# Patient Record
Sex: MALE | Race: WHITE | NOT HISPANIC OR LATINO | Employment: OTHER | URBAN - METROPOLITAN AREA
[De-identification: names, ages, dates, MRNs, and addresses within clinical notes are randomized per-mention and may not be internally consistent; named-entity substitution may affect disease eponyms.]

---

## 2018-05-23 ENCOUNTER — HOSPITAL ENCOUNTER (EMERGENCY)
Facility: HOSPITAL | Age: 63
Discharge: HOME | End: 2018-05-23
Attending: EMERGENCY MEDICINE | Admitting: EMERGENCY MEDICINE

## 2018-05-23 VITALS
RESPIRATION RATE: 18 BRPM | HEART RATE: 92 BPM | WEIGHT: 170 LBS | SYSTOLIC BLOOD PRESSURE: 179 MMHG | TEMPERATURE: 99.9 F | HEIGHT: 67 IN | DIASTOLIC BLOOD PRESSURE: 106 MMHG | OXYGEN SATURATION: 99 % | BODY MASS INDEX: 26.68 KG/M2

## 2018-05-23 DIAGNOSIS — K14.8 TONGUE LESION: Primary | ICD-10-CM

## 2018-05-23 LAB
ALBUMIN SERPL-MCNC: 4.5 G/DL (ref 3.5–5.2)
ALBUMIN/GLOB SERPL: 1.5 G/DL
ALP SERPL-CCNC: 85 U/L (ref 39–117)
ALT SERPL W P-5'-P-CCNC: 88 U/L (ref 1–41)
ANION GAP SERPL CALCULATED.3IONS-SCNC: 15.2 MMOL/L
AST SERPL-CCNC: 83 U/L (ref 1–40)
BASOPHILS # BLD AUTO: 0.09 10*3/MM3 (ref 0–0.2)
BASOPHILS NFR BLD AUTO: 0.9 % (ref 0–1.5)
BILIRUB SERPL-MCNC: 2 MG/DL (ref 0.1–1.2)
BUN BLD-MCNC: 12 MG/DL (ref 8–23)
BUN/CREAT SERPL: 10.3 (ref 7–25)
CALCIUM SPEC-SCNC: 9.9 MG/DL (ref 8.6–10.5)
CHLORIDE SERPL-SCNC: 93 MMOL/L (ref 98–107)
CO2 SERPL-SCNC: 24.8 MMOL/L (ref 22–29)
CREAT BLD-MCNC: 1.16 MG/DL (ref 0.76–1.27)
DEPRECATED RDW RBC AUTO: 48.4 FL (ref 37–54)
EOSINOPHIL # BLD AUTO: 0.06 10*3/MM3 (ref 0–0.7)
EOSINOPHIL NFR BLD AUTO: 0.6 % (ref 0.3–6.2)
ERYTHROCYTE [DISTWIDTH] IN BLOOD BY AUTOMATED COUNT: 13.9 % (ref 11.5–14.5)
GFR SERPL CREATININE-BSD FRML MDRD: 64 ML/MIN/1.73
GLOBULIN UR ELPH-MCNC: 3 GM/DL
GLUCOSE BLD-MCNC: 105 MG/DL (ref 65–99)
HCT VFR BLD AUTO: 39.7 % (ref 40.4–52.2)
HGB BLD-MCNC: 13.4 G/DL (ref 13.7–17.6)
IMM GRANULOCYTES # BLD: 0.03 10*3/MM3 (ref 0–0.03)
IMM GRANULOCYTES NFR BLD: 0.3 % (ref 0–0.5)
LYMPHOCYTES # BLD AUTO: 1.35 10*3/MM3 (ref 0.9–4.8)
LYMPHOCYTES NFR BLD AUTO: 13 % (ref 19.6–45.3)
MCH RBC QN AUTO: 32.6 PG (ref 27–32.7)
MCHC RBC AUTO-ENTMCNC: 33.8 G/DL (ref 32.6–36.4)
MCV RBC AUTO: 96.6 FL (ref 79.8–96.2)
MONOCYTES # BLD AUTO: 1.61 10*3/MM3 (ref 0.2–1.2)
MONOCYTES NFR BLD AUTO: 15.5 % (ref 5–12)
NEUTROPHILS # BLD AUTO: 7.29 10*3/MM3 (ref 1.9–8.1)
NEUTROPHILS NFR BLD AUTO: 70 % (ref 42.7–76)
PLATELET # BLD AUTO: 161 10*3/MM3 (ref 140–500)
PMV BLD AUTO: 12.7 FL (ref 6–12)
POTASSIUM BLD-SCNC: 4.3 MMOL/L (ref 3.5–5.2)
PROT SERPL-MCNC: 7.5 G/DL (ref 6–8.5)
RBC # BLD AUTO: 4.11 10*6/MM3 (ref 4.6–6)
SODIUM BLD-SCNC: 133 MMOL/L (ref 136–145)
WBC NRBC COR # BLD: 10.4 10*3/MM3 (ref 4.5–10.7)

## 2018-05-23 PROCEDURE — 99283 EMERGENCY DEPT VISIT LOW MDM: CPT

## 2018-05-23 PROCEDURE — 85025 COMPLETE CBC W/AUTO DIFF WBC: CPT | Performed by: NURSE PRACTITIONER

## 2018-05-23 PROCEDURE — 80053 COMPREHEN METABOLIC PANEL: CPT | Performed by: NURSE PRACTITIONER

## 2018-05-23 RX ORDER — METOPROLOL TARTRATE 50 MG/1
50 TABLET, FILM COATED ORAL 2 TIMES DAILY
COMMUNITY

## 2018-05-23 RX ORDER — FENTANYL 25 UG/H
1 PATCH TRANSDERMAL
COMMUNITY

## 2018-05-23 RX ORDER — OXYCODONE HYDROCHLORIDE 15 MG/1
15 TABLET ORAL EVERY 6 HOURS PRN
COMMUNITY

## 2018-05-23 RX ORDER — ALLOPURINOL 100 MG/1
100 TABLET ORAL DAILY
COMMUNITY

## 2018-05-23 RX ADMIN — LIDOCAINE HYDROCHLORIDE 5 ML: 20 SOLUTION ORAL; TOPICAL at 14:27

## 2018-05-23 NOTE — ED PROVIDER NOTES
Pt is a 62 y.o. male who presents to the ED complaining of tongue pain and swelling that started after waking up yesterday morning. Pt denies cough, SOA, CP, throat pain. Pt reports a hx of gout.      On exam,  Constitutional: A&Ox3, NAD  HENT: There is bilateral desquamation with mild swelling and bruising to middle of tongue, the is no buccal lesion, no pharyngeal lesions, no difficulty tolerating secretions    Plan: Consult with oral surgery    D/W Dr Castle who will see patient in the office tomorrow and biopsy if necessary.      MD ATTESTATION NOTE    The TRAVIS and I have discussed this patient's history, physical exam, and treatment plan.  I have reviewed the documentation and personally had a face to face interaction with the patient. I affirm the documentation and agree with the treatment and plan.  The attached note describes my personal findings.      Documentation assistance provided by mae Ballesteros for Dr. Cordon. Information recorded by the scribe was done at my direction and has been verified and validated by me.             Rasta Ballesteros  05/23/18 0717       Luis Cordon MD  05/23/18 4725

## 2018-05-23 NOTE — ED PROVIDER NOTES
"  EMERGENCY DEPARTMENT ENCOUNTER    CHIEF COMPLAINT  Chief Complaint: oral lesions  History given by: patient  History limited by: N/A  Room Number: 18/18  PMD: No Known Provider      HPI:  Pt is a 62 y.o. male who presents with painful oral lesions to his tongue that he noticed yesterday. The tongue lesions have worsened today. The pain is exacerbated by mastication. He has also had subjective fever with chills. He denies any other oral lesions, genital lesions, any other lesions to his body, skin rash, cough, sore throat, N/V/D, abd pain, trouble swallowing, dyspnea, chest pain, recent lifestyle changes, recent new exposures, and use of chewing tobacco. He reports that he has not been sexually active in \"quite some time\". He also notes that he is in pain management and takes oxycodone and fentanyl for chronic pain and peripheral neuropathy. Past Medical History of HTN, gout (on allopurinol), chronic pain, and peripheral neuropathy.     Duration: noticed yesterday  Timing: constant  Location: tongue  Radiation: none  Quality: painful  Intensity/Severity: moderate  Progression: worse  Associated Symptoms: subjective fever with chills  Aggravating Factors: pain is exacerbated by mastication   Alleviating Factors: none  Previous Episodes: none  Treatment before arrival: none mentioned    PAST MEDICAL HISTORY  Active Ambulatory Problems     Diagnosis Date Noted   • No Active Ambulatory Problems     Resolved Ambulatory Problems     Diagnosis Date Noted   • No Resolved Ambulatory Problems     Past Medical History:   Diagnosis Date   • Gout    • Hypertension        PAST SURGICAL HISTORY  Past Surgical History:   Procedure Laterality Date   • APPENDECTOMY     • REPLACEMENT TOTAL HIP LATERAL POSITION Right    • REPLACEMENT TOTAL KNEE Left    • SPINE SURGERY         FAMILY HISTORY  History reviewed. No pertinent family history.    SOCIAL HISTORY  Social History     Social History   • Marital status: Single     Spouse name: " N/A   • Number of children: N/A   • Years of education: N/A     Occupational History   • Not on file.     Social History Main Topics   • Smoking status: Never Smoker   • Smokeless tobacco: Never Used   • Alcohol use Yes      Comment: avg 6-9/daily   • Drug use: No   • Sexual activity: Not on file     Other Topics Concern   • Not on file     Social History Narrative   • No narrative on file         ALLERGIES  Penicillins    REVIEW OF SYSTEMS  Review of Systems   Constitutional: Positive for chills (subjective fever with chills) and fever (subjective fever with chills).   HENT: Positive for mouth sores (painful tongue lesions). Negative for sore throat and trouble swallowing.    Respiratory: Negative for shortness of breath.    Cardiovascular: Negative for chest pain.   Gastrointestinal: Negative for abdominal pain, diarrhea, nausea and vomiting.   Genitourinary: Negative for difficulty urinating and dysuria.   Musculoskeletal: Negative for back pain.   Skin: Negative for rash.   Neurological: Negative for dizziness.   Psychiatric/Behavioral: The patient is not nervous/anxious.        PHYSICAL EXAM  ED Triage Vitals   Temp Heart Rate Resp BP SpO2   05/23/18 1247 05/23/18 1247 05/23/18 1250 05/23/18 1253 05/23/18 1247   100.4 °F (38 °C) (!) 121 18 (!) 176/111 96 % WNL       Physical Exam   Constitutional: He is oriented to person, place, and time and well-developed, well-nourished, and in no distress.  Non-toxic appearance. No distress.   HENT:   Head: Atraumatic.   Mouth/Throat: Mucous membranes are normal. No oropharyngeal exudate, posterior oropharyngeal edema or posterior oropharyngeal erythema.   desquamation with mild swelling and bruising to middle of tongue, no buccal lesions, no pharyngeal lesions, tolerating secretions without difficulty, speaks in full sentences, no drooling   Eyes: No scleral icterus.   Neck: Normal range of motion.   Cardiovascular: Normal rate, regular rhythm and normal heart sounds.     Pulmonary/Chest: Effort normal and breath sounds normal. No respiratory distress.   No airway obstruction   Musculoskeletal: Normal range of motion.   Neurological: He is alert and oriented to person, place, and time. He has normal motor skills and normal sensation.   Skin: Skin is warm and dry.   Psychiatric: Mood and affect normal.   Nursing note and vitals reviewed.      LAB RESULTS  Recent Results (from the past 24 hour(s))   Comprehensive Metabolic Panel    Collection Time: 05/23/18  2:33 PM   Result Value Ref Range    Glucose 105 (H) 65 - 99 mg/dL    BUN 12 8 - 23 mg/dL    Creatinine 1.16 0.76 - 1.27 mg/dL    Sodium 133 (L) 136 - 145 mmol/L    Potassium 4.3 3.5 - 5.2 mmol/L    Chloride 93 (L) 98 - 107 mmol/L    CO2 24.8 22.0 - 29.0 mmol/L    Calcium 9.9 8.6 - 10.5 mg/dL    Total Protein 7.5 6.0 - 8.5 g/dL    Albumin 4.50 3.50 - 5.20 g/dL    ALT (SGPT) 88 (H) 1 - 41 U/L    AST (SGOT) 83 (H) 1 - 40 U/L    Alkaline Phosphatase 85 39 - 117 U/L    Total Bilirubin 2.0 (H) 0.1 - 1.2 mg/dL    eGFR Non African Amer 64 >60 mL/min/1.73    Globulin 3.0 gm/dL    A/G Ratio 1.5 g/dL    BUN/Creatinine Ratio 10.3 7.0 - 25.0    Anion Gap 15.2 mmol/L   CBC Auto Differential    Collection Time: 05/23/18  2:33 PM   Result Value Ref Range    WBC 10.40 4.50 - 10.70 10*3/mm3    RBC 4.11 (L) 4.60 - 6.00 10*6/mm3    Hemoglobin 13.4 (L) 13.7 - 17.6 g/dL    Hematocrit 39.7 (L) 40.4 - 52.2 %    MCV 96.6 (H) 79.8 - 96.2 fL    MCH 32.6 27.0 - 32.7 pg    MCHC 33.8 32.6 - 36.4 g/dL    RDW 13.9 11.5 - 14.5 %    RDW-SD 48.4 37.0 - 54.0 fl    MPV 12.7 (H) 6.0 - 12.0 fL    Platelets 161 140 - 500 10*3/mm3    Neutrophil % 70.0 42.7 - 76.0 %    Lymphocyte % 13.0 (L) 19.6 - 45.3 %    Monocyte % 15.5 (H) 5.0 - 12.0 %    Eosinophil % 0.6 0.3 - 6.2 %    Basophil % 0.9 0.0 - 1.5 %    Immature Grans % 0.3 0.0 - 0.5 %    Neutrophils, Absolute 7.29 1.90 - 8.10 10*3/mm3    Lymphocytes, Absolute 1.35 0.90 - 4.80 10*3/mm3    Monocytes, Absolute 1.61 (H)  0.20 - 1.20 10*3/mm3    Eosinophils, Absolute 0.06 0.00 - 0.70 10*3/mm3    Basophils, Absolute 0.09 0.00 - 0.20 10*3/mm3    Immature Grans, Absolute 0.03 0.00 - 0.03 10*3/mm3       I ordered the above labs and reviewed the results        PROGRESS AND CONSULTS  1400- Reviewed pt's history and workup with Dr. Cordon.  At bedside evaluation, they agree with the plan of care.  1402- Ordered blood work for further evaluation. Ordered viscous lidocaine oral solution for pain.   1452- Sent call out to on call oral surgeon for consult.   1624- Rechecked pt. He is resting comfortably and is in no acute distress. Pt states that the viscous lidocaine temporarily alleviated his pain but his pain is now starting to return. Discussed with pt about all pertinent results including normal WBC count and otherwise stable labs. Informed pt of plan to prescribe viscous lidocaine oral solution for pain.   Reviewed implications of results, diagnosis, meds, responsibility to follow up, warning signs and symptoms of possible worsening, potential complications and reasons to return to ER with patient.  Discussed all results and noted any abnormalities with patient.  Discussed the importance of and the absolute need to follow up closely with referred oral surgeon for recheck of abnormalities and condition and for further testing/treatment as needed (instructed pt to call the office tomorrow to arrange close follow up appointment). Strict RTER warnings given. Advised pt to rinse mouth after eating.   Discussed plan for discharge, as there is no emergent indication for admission.  Pt is agreeable and understands need for follow up and repeat testing.  Pt is aware that discharge does not mean that nothing is wrong but it indicates no emergency is present.  Pt is discharged with instructions to follow up with primary care doctor to have their blood pressure rechecked.   1637- Call not returned from oral surgeon's office.       DIAGNOSIS  Final  "diagnoses:   Tongue lesion       FOLLOW UP   Hussein Castle, DMD  [address]  [facility] [address]  [phone]    Call in 1 day        RX     Medication List     lidocaine viscous 2 % solution  Commonly known as:  XYLOCAINE  Take 5 mL by mouth Every 3 (Three) Hours As Needed for Mild Pain .         COURSE & MEDICAL DECISION MAKING  Pertinent Labs that were ordered and reviewed are noted above.  Results were reviewed/discussed with the patient and they were also made aware of online assess.   Pt also made aware that some labs, such as cultures, will not be resulted during ER visit and follow up with PMD is necessary.     MEDICATIONS GIVEN IN ER  Medications   lidocaine viscous (XYLOCAINE) 2 % mouth solution 5 mL (5 mL Mouth/Throat Given 5/23/18 1427)       BP (!) 171/104   Pulse 87   Temp 100.4 °F (38 °C) (Tympanic)   Resp 18   Ht 170.2 cm (67\")   Wt 77.1 kg (170 lb)   SpO2 96%   BMI 26.63 kg/m²       I personally reviewed the past medical history, past surgical history, social history, family history, current medications and allergies as they appear in this chart.  The scribe's note accurately reflects the work and decisions made by me.     Documentation assistance provided by mae Hanson for LEXUS Granados on 5/23/2018 at 4:41 PM. Information recorded by the scribe was done at my direction and has been verified and validated by me.      Charley Hanson  05/23/18 4243       JAVIER Burroughs  05/24/18 6424    "

## 2018-05-23 NOTE — DISCHARGE INSTRUCTIONS
Medications as ordered  Rinse mouth out after eating  Follow up with Dr Castle-call in am for appointment  Return to er for fever, chills, inability to tolerate po fluids, worsening swelling, increased pain or any new or worsening symptoms

## 2019-01-04 ENCOUNTER — HOSPITAL ENCOUNTER (OUTPATIENT)
Dept: GENERAL RADIOLOGY | Facility: HOSPITAL | Age: 64
Discharge: HOME | End: 2019-01-04
Attending: FAMILY MEDICINE

## 2019-04-03 ENCOUNTER — HOSPITAL ENCOUNTER (OUTPATIENT)
Dept: SURGERY | Facility: HOSPITAL | Age: 64
Setting detail: HOSPITAL OUTPATIENT SURGERY
Discharge: HOME | End: 2019-04-03
Attending: OPHTHALMOLOGY